# Patient Record
Sex: FEMALE | Race: WHITE | NOT HISPANIC OR LATINO | Employment: FULL TIME | URBAN - METROPOLITAN AREA
[De-identification: names, ages, dates, MRNs, and addresses within clinical notes are randomized per-mention and may not be internally consistent; named-entity substitution may affect disease eponyms.]

---

## 2024-08-28 ENCOUNTER — HOSPITAL ENCOUNTER (EMERGENCY)
Facility: MEDICAL CENTER | Age: 34
End: 2024-08-28
Attending: OBSTETRICS & GYNECOLOGY | Admitting: OBSTETRICS & GYNECOLOGY

## 2024-08-28 ENCOUNTER — HOSPITAL ENCOUNTER (EMERGENCY)
Facility: MEDICAL CENTER | Age: 34
End: 2024-08-28

## 2024-08-28 VITALS
WEIGHT: 176 LBS | BODY MASS INDEX: 32.39 KG/M2 | RESPIRATION RATE: 18 BRPM | OXYGEN SATURATION: 95 % | HEIGHT: 62 IN | SYSTOLIC BLOOD PRESSURE: 108 MMHG | DIASTOLIC BLOOD PRESSURE: 69 MMHG | TEMPERATURE: 97.1 F | HEART RATE: 74 BPM

## 2024-08-28 LAB
APPEARANCE UR: ABNORMAL
BILIRUB UR QL STRIP.AUTO: NEGATIVE
COLOR UR AUTO: YELLOW
GLUCOSE UR QL STRIP.AUTO: NEGATIVE MG/DL
KETONES UR QL STRIP.AUTO: NEGATIVE MG/DL
LEUKOCYTE ESTERASE UR QL STRIP.AUTO: ABNORMAL
NITRITE UR QL STRIP.AUTO: NEGATIVE
PH UR STRIP.AUTO: 6.5 [PH] (ref 5–8)
PROT UR QL STRIP: NEGATIVE MG/DL
RBC UR QL AUTO: NEGATIVE
SP GR UR STRIP.AUTO: 1.01 (ref 1–1.03)
UROBILINOGEN UR STRIP.AUTO-MCNC: 0.2 MG/DL

## 2024-08-28 PROCEDURE — 99282 EMERGENCY DEPT VISIT SF MDM: CPT

## 2024-08-28 PROCEDURE — 59025 FETAL NON-STRESS TEST: CPT

## 2024-08-28 PROCEDURE — 81002 URINALYSIS NONAUTO W/O SCOPE: CPT

## 2024-08-28 PROCEDURE — 99283 EMERGENCY DEPT VISIT LOW MDM: CPT | Performed by: FAMILY MEDICINE

## 2024-08-28 PROCEDURE — 302449 STATCHG TRIAGE ONLY (STATISTIC)

## 2024-08-28 ASSESSMENT — PAIN SCALES - GENERAL: PAINLEVEL: 5 - MODERATE PAIN

## 2024-08-28 NOTE — DISCHARGE INSTRUCTIONS
Pre-term Labor (<37 weeks):  Call your physician or return to the hospital if:  You have painless regular contractions more than 4 in one hour.  Your water breaks (remember time and color).  You have menstrual-like cramps, a low dull backache or pressure in your pelvis or back.  Your baby does not move enough to complete the daily kick count (10 movements in 2 hours).  Your baby moves much less often than on the days before or you have not felt your baby move all day.  Please review the MEDICATION LIST section of your AFTER VISIT SUMMARY document.  Take your medication as prescribed      Constipation, Adult  Constipation is when a person has trouble pooping (having a bowel movement). When you have this condition, you may poop fewer than 3 times a week. Your poop (stool) may also be dry, hard, or bigger than normal.  Follow these instructions at home:  Eating and drinking    Eat foods that have a lot of fiber, such as:  Fresh fruits and vegetables.  Whole grains.  Beans.  Eat less of foods that are low in fiber and high in fat and sugar, such as:  French fries.  Hamburgers.  Cookies.  Candy.  Soda.  Drink enough fluid to keep your pee (urine) pale yellow.  General instructions  Exercise regularly or as told by your doctor. Try to do 150 minutes of exercise each week.  Go to the restroom when you feel like you need to poop. Do not hold it in.  Take over-the-counter and prescription medicines only as told by your doctor. These include any fiber supplements.  When you poop:  Do deep breathing while relaxing your lower belly (abdomen).  Relax your pelvic floor. The pelvic floor is a group of muscles that support the rectum, bladder, and intestines (as well as the uterus in women).  Watch your condition for any changes. Tell your doctor if you notice any.  Keep all follow-up visits as told by your doctor. This is important.  Contact a doctor if:  You have pain that gets worse.  You have a fever.  You have not pooped for 4  days.  You vomit.  You are not hungry.  You lose weight.  You are bleeding from the opening of the butt (anus).  You have thin, pencil-like poop.  Get help right away if:  You have a fever, and your symptoms suddenly get worse.  You leak poop or have blood in your poop.  Your belly feels hard or bigger than normal (bloated).  You have very bad belly pain.  You feel dizzy or you faint.  Summary  Constipation is when a person poops fewer than 3 times a week, has trouble pooping, or has poop that is dry, hard, or bigger than normal.  Eat foods that have a lot of fiber.  Drink enough fluid to keep your pee (urine) pale yellow.  Take over-the-counter and prescription medicines only as told by your doctor. These include any fiber supplements.  This information is not intended to replace advice given to you by your health care provider. Make sure you discuss any questions you have with your health care provider.  Document Revised: 11/04/2020 Document Reviewed: 11/04/2020  [employer] Patient Education © 2023 [employer]

## 2024-08-28 NOTE — PROGRESS NOTES
"34 y.o  EDC 24 (30w2d)    Pt arrives to Labor and Delivery for left sided flank pain that she states comes and goes, is a \"stabbing\" pain 7/10 when happening and at 4-5/10 \"dull\" pain when not happening. She also is having lightheadedness which all of these symptoms started around 1100 today. Pt states +FM, denies LOF/VB/Ctx. Monitors applied x2. VSS. Pt's OB is Dr. Varma in [place]. POC discussed. All questions and concerns addressed at this time.    1430 Jarocho PETE at bedside. Order received to D/C home.    1438  labor precautions reviewed. Pt verbalized understanding of instructions. All questions and concerns addressed at this time. PT ambulated off unit in stable condition with belongings in hand.  "

## 2024-08-28 NOTE — ED TRIAGE NOTES
OB Triage/ED Evaluation Note    Nisha Everett is a 34 y.o. female  at 30w2d by stated dates who presents for left flank pain.    Subjective:   Pt had had a couple of episodes of left flank pain, which she describes as sharp pain that radiates to her ribs, but is short lived. No tenderness on palpation. She does admit to constipation. No dysuria symptoms or history of kidney stones. No recent injury or fall, or trauma. Pt feels well and looks well, denies fever, chills. Denies abdominal pain or reflux. No obstetric complaints today.    Pt receives prenatal care in [place], [place], but works here in [place]. We do not have [facility]. She states that there have not been any problems in pregnancy and all labs and imaging have been normal. She has an appointment next Monday.    Uterine contractions denies  Leakage of fluid denies  vaginal bleeding denies  fetal movement affirms    ROS:  GEN: denies fever, chills  HEENT: denies headache, denies blurry vision  CV: denies chest pain or palpitations, BLE edema at baseline  RESP: denies chest pain or shortness of breath  ABD: denies RUQ pain  : denies dysuria      I personally reviewed the past medical and surgical histories, as well as the problem list.    Prenatal care with [facility] with following problems:  There are no problems to display for this patient.      No past medical history on file.  No past surgical history on file.  No family history on file.  OB History    Para Term  AB Living   1             SAB IAB Ectopic Molar Multiple Live Births                    # Outcome Date GA Lbr Familia/2nd Weight Sex Type Anes PTL Lv   1 Current              Social History     Socioeconomic History    Marital status: Single     Spouse name: Not on file    Number of children: Not on file    Years of education: Not on file    Highest education level: Not on file   Occupational History    Not on file  "  Tobacco Use    Smoking status: Not on file    Smokeless tobacco: Not on file   Substance and Sexual Activity    Alcohol use: Not on file    Drug use: Not on file    Sexual activity: Not on file   Other Topics Concern    Not on file   Social History Narrative    Not on file     Social Determinants of Health     Financial Resource Strain: Not on file   Food Insecurity: Not on file   Transportation Needs: Not on file   Physical Activity: Not on file   Stress: Not on file   Social Connections: Not on file   Intimate Partner Violence: Not on file   Housing Stability: Not on file     No Known Allergies   No current facility-administered medications for this encounter.  No current outpatient medications on file.      Objective:      Vitals:    24 1348   BP: 108/69   Pulse: 74   Resp:    Temp:    SpO2:        GEN: NAD, AAOx3, calm, cooperative, laying comfortably in bed  HEENT: NCAT, EOMI  CV: +S1S2, RRR, 1+ BLE edema, radial pulses 2+  RESP: CTAB, no cough, breathing comfortably on RA  ABS: soft, NTTP, gravid  : no lesions  MSK: moving all extremities    SVE: deferred    FHT: Cat I, baseline 130, variability moderate, +accels, -decels, -UC    Lab Review  Lab:   Blood type: unknown  Recent Results (from the past 5880 hour(s))   POCT urinalysis device results    Collection Time: 24  1:52 PM   Result Value Ref Range    POC Color Yellow     POC Appearance Slightly Cloudy (A)     POC Glucose Negative Negative mg/dL    POC Ketones Negative Negative mg/dL    POC Specific Gravity 1.010 1.005 - 1.030    POC Blood Negative Negative    POC Urine PH 6.5 5.0 - 8.0    POC Protein Negative Negative mg/dL    Bilirubin Negative Negative    Urobilinogen, Urine 0.2 Negative    POC Nitrites Negative Negative    POC Leukocyte Esterase Small (A) Negative           Invalid input(s): \"CBC PLTDF\", \"HIV\", \"CHLAM\"        Assessment and Plan:     Nisha Everett is a 34 y.o. female  at 30w2d by stated dates who presents for " left flank pain.    #SIUP at 30w2d by stated dates  - prenatal care in [facility], no records available  - Labor status: Not in labor.    #fetal status, Cat I  - reassuring  - continuous fetal monitoring    #left flank pain, likely due to constipation  - recommend Miralax twice daily for 1-2 weeks until constipation resolves, then transition to once daily  - UA without evidence of infection  - no musculoskeletal information    Disposition:  - Discharge to home  - labor precautions given: She was counseled to call or return for vaginal bleeding, regular contractions, leakage of fluid or decreased fetal movement.  - fetal kick counts every evening  - follow-up with outpatient prenatal appointments    The patient was instructed to follow up in the office in one week as scheduled.     Alexandra Avendaño MD, MPH